# Patient Record
Sex: MALE | Race: WHITE | URBAN - METROPOLITAN AREA
[De-identification: names, ages, dates, MRNs, and addresses within clinical notes are randomized per-mention and may not be internally consistent; named-entity substitution may affect disease eponyms.]

---

## 2018-03-31 ENCOUNTER — OFFICE VISIT (OUTPATIENT)
Dept: FAMILY MEDICINE CLINIC | Age: 33
End: 2018-03-31

## 2018-03-31 VITALS
HEART RATE: 54 BPM | OXYGEN SATURATION: 97 % | DIASTOLIC BLOOD PRESSURE: 60 MMHG | BODY MASS INDEX: 28.98 KG/M2 | TEMPERATURE: 97 F | WEIGHT: 207 LBS | HEIGHT: 71 IN | SYSTOLIC BLOOD PRESSURE: 116 MMHG | RESPIRATION RATE: 17 BRPM

## 2018-03-31 DIAGNOSIS — L42 PITYRIASIS ROSEA: Primary | ICD-10-CM

## 2018-03-31 RX ORDER — PREDNISONE 10 MG/1
10 TABLET ORAL 3 TIMES DAILY
Qty: 21 TAB | Refills: 0 | Status: SHIPPED | OUTPATIENT
Start: 2018-03-31 | End: 2018-04-07

## 2018-03-31 NOTE — MR AVS SNAPSHOT
[address] [address] 
[phone] Patient: Marin Goodrich MRN: [id number] :1985 Visit Information Date & Time Provider Department Dept. Phone Encounter #  
 3/31/2018 11:15 AM Priscilla Lutz, [address] [facility] [phone] [id number] Upcoming Health Maintenance Date Due DTaP/Tdap/Td series (1 - Tdap) 2005 Influenza Age 5 to Adult 2017 Allergies as of 3/31/2018  Review Complete On: 3/31/2018 By: Qian Rabago LPN No Known Allergies Current Immunizations  Never Reviewed No immunizations on file. Not reviewed this visit You Were Diagnosed With   
  
 Codes Comments Pityriasis rosea    -  Primary ICD-10-CM: L42 
ICD-9-CM: 696.3 Vitals BP Pulse Temp Resp Height(growth percentile) Weight(growth percentile) 116/60 (!) 54 97 °F (36.1 °C) (Oral) 17 5' 11\" (1.803 m) 207 lb (93.9 kg) SpO2 BMI Smoking Status 97% 28.87 kg/m2 Never Smoker BMI and BSA Data Body Mass Index Body Surface Area  
 28.87 kg/m 2 2.17 m 2 Preferred Pharmacy Pharmacy Name Phone  [facility], [address] [place] [address] Izabela Ayoub [phone] Your Updated Medication List  
  
Notice  As of 3/31/2018 11:38 AM  
 You have not been prescribed any medications. Patient Instructions Pityriasis Rosea: Care Instructions Your Care Instructions Pityriasis rosea (say \"pit-uh-RY-uh-chitra ISIS-zee-uh\") is a common skin rash. It usually starts as one scaly, reddish-pink spot on your stomach or back. Days or weeks later, more spots appear. The rash may itch, but it will not spread to other people. No one knows what causes pityriasis rosea. Some doctors believe it is a reaction to a virus. Pityriasis rosea is most common in children and young adults.  It lasts 1 to 3 months and then goes away on its own. Medicine can help relieve any itching. Follow-up care is a key part of your treatment and safety. Be sure to make and go to all appointments, and call your doctor if you are having problems. It's also a good idea to know your test results and keep a list of the medicines you take. How can you care for yourself at home? · Use your medicine exactly as prescribed. Call your doctor if you have any problems with your medicine. · Expose your skin to small amounts of sunlight, but avoid sunburn. Sunlight can lessen the rash. · Use a mild soap, such as Dove or Cetaphil, when you wash your skin. · Add a handful of oatmeal (ground to a powder) to your bath. Or you can try an oatmeal bath product, such as Aveeno. Keep the water warm or lukewarm. A hot bath or shower may make the rash more visible and itchy. · Use an over-the-counter 1% hydrocortisone cream for small itchy areas. When should you call for help? Call your doctor now or seek immediate medical care if: 
? · You have signs of infection such as: 
¨ Pain, warmth, or swelling near the rash. ¨ Red streaks near the rash. ¨ Pus coming from the rash. ¨ A fever. ? Watch closely for changes in your health, and be sure to contact your doctor if: 
? · You see the rash on the palms of your hands or the soles of your feet. ? · You do not get better as expected. Where can you learn more? Go to http://rosa-yonatan.info/. Enter S327 in the search box to learn more about \"Pityriasis Rosea: Care Instructions. \" Current as of: October 13, 2016 Content Version: 11.4 © 1477-4013 [employer]. Care instructions adapted under license by [employer] (which disclaims liability or warranty for this information).  If you have questions about a medical condition or this instruction, always ask your healthcare professional. [employer] disclaims any warranty or liability for your use of this information. Introducing [facility]! [employer] introduces [employer] patient portal. Now you can access parts of your medical record, email your doctor's office, and request medication refills online. 1. In your internet browser, go to [email or web address] 2. Click on the First Time User? Click Here link in the Sign In box. You will see the New Member Sign Up page. 3. Enter your [employer] Access Code exactly as it appears below. You will not need to use this code after youve completed the sign-up process. If you do not sign up before the expiration date, you must request a new code. · [employer] Access Code: [email or web address] Expires: 6/29/2018 11:38 AM 
 
4. Enter the last four digits of your Social Security Number (xxxx) and Date of Birth (mm/dd/yyyy) as indicated and click Submit. You will be taken to the next sign-up page. 5. Create a [employer] ID. This will be your [employer] login ID and cannot be changed, so think of one that is secure and easy to remember. 6. Create a [employer] password. You can change your password at any time. 7. Enter your Password Reset Question and Answer. This can be used at a later time if you forget your password. 8. Enter your e-mail address. You will receive e-mail notification when new information is available in [address] 9. Click Sign Up. You can now view and download portions of your medical record. 10. Click the Download Summary menu link to download a portable copy of your medical information. If you have questions, please visit the Frequently Asked Questions section of the [employer] website. Remember, [employer] is NOT to be used for urgent needs. For medical emergencies, dial 911. Now available from your iPhone and Android! Please provide this summary of care documentation to your next provider. If you have any questions after today's visit, please call [phone].

## 2018-03-31 NOTE — PATIENT INSTRUCTIONS
Pityriasis Rosea: Care Instructions  Your Care Instructions    Pityriasis rosea (say \"pit-uh-RY-uh-chitra ISIS-zee-uh\") is a common skin rash. It usually starts as one scaly, reddish-pink spot on your stomach or back. Days or weeks later, more spots appear. The rash may itch, but it will not spread to other people. No one knows what causes pityriasis rosea. Some doctors believe it is a reaction to a virus. Pityriasis rosea is most common in children and young adults. It lasts 1 to 3 months and then goes away on its own. Medicine can help relieve any itching. Follow-up care is a key part of your treatment and safety. Be sure to make and go to all appointments, and call your doctor if you are having problems. It's also a good idea to know your test results and keep a list of the medicines you take. How can you care for yourself at home? · Use your medicine exactly as prescribed. Call your doctor if you have any problems with your medicine. · Expose your skin to small amounts of sunlight, but avoid sunburn. Sunlight can lessen the rash. · Use a mild soap, such as Dove or Cetaphil, when you wash your skin. · Add a handful of oatmeal (ground to a powder) to your bath. Or you can try an oatmeal bath product, such as Aveeno. Keep the water warm or lukewarm. A hot bath or shower may make the rash more visible and itchy. · Use an over-the-counter 1% hydrocortisone cream for small itchy areas. When should you call for help? Call your doctor now or seek immediate medical care if:  ? · You have signs of infection such as:  ¨ Pain, warmth, or swelling near the rash. ¨ Red streaks near the rash. ¨ Pus coming from the rash. ¨ A fever. ? Watch closely for changes in your health, and be sure to contact your doctor if:  ? · You see the rash on the palms of your hands or the soles of your feet. ? · You do not get better as expected. Where can you learn more?   Go to http://rosa-yonatan.info/. Enter S327 in the search box to learn more about \"Pityriasis Rosea: Care Instructions. \"  Current as of: October 13, 2016  Content Version: 11.4  © 8396-9964 [employer]. Care instructions adapted under license by [employer] (which disclaims liability or warranty for this information). If you have questions about a medical condition or this instruction, always ask your healthcare professional. [address] any warranty or liability for your use of this information.

## 2018-03-31 NOTE — PROGRESS NOTES
Torey Pichardo is a 34 y.o. male presents to office to establish care.  Rash on chest for about a week

## 2018-04-15 NOTE — PROGRESS NOTES
HISTORY OF PRESENT ILLNESS  Felix Samuels is a 34 y.o. male presenting with a rash on her chest for about a week. Establish Care   This is a new problem. The current episode started more than 2 days ago. Skin Problem         Review of Systems   Constitutional: Negative for chills and fever. Skin: Positive for itching and rash. Physical Exam   Neck: Neck supple. Lymphadenopathy:     He has no cervical adenopathy. Skin: Rash noted. ASSESSMENT and PLAN    ICD-10-CM ICD-9-CM    1.  Pityriasis rosea L42 696.3